# Patient Record
Sex: FEMALE | Race: BLACK OR AFRICAN AMERICAN | URBAN - NONMETROPOLITAN AREA
[De-identification: names, ages, dates, MRNs, and addresses within clinical notes are randomized per-mention and may not be internally consistent; named-entity substitution may affect disease eponyms.]

---

## 2018-12-31 ENCOUNTER — NURSE TRIAGE (OUTPATIENT)
Dept: ADMINISTRATIVE | Age: 28
End: 2018-12-31

## 2018-12-31 NOTE — TELEPHONE ENCOUNTER
Had flu yesterday - is better today but is having a sharp pain in hip - recently went to DR is now having vaginal pain        Is already planned on going to ED. No triage.